# Patient Record
Sex: MALE | Race: WHITE | NOT HISPANIC OR LATINO
[De-identification: names, ages, dates, MRNs, and addresses within clinical notes are randomized per-mention and may not be internally consistent; named-entity substitution may affect disease eponyms.]

---

## 2021-07-31 ENCOUNTER — NON-APPOINTMENT (OUTPATIENT)
Age: 61
End: 2021-07-31

## 2021-08-05 ENCOUNTER — NON-APPOINTMENT (OUTPATIENT)
Age: 61
End: 2021-08-05

## 2021-08-05 ENCOUNTER — APPOINTMENT (OUTPATIENT)
Dept: INTERNAL MEDICINE | Facility: CLINIC | Age: 61
End: 2021-08-05
Payer: COMMERCIAL

## 2021-08-05 ENCOUNTER — TRANSCRIPTION ENCOUNTER (OUTPATIENT)
Age: 61
End: 2021-08-05

## 2021-08-05 ENCOUNTER — LABORATORY RESULT (OUTPATIENT)
Age: 61
End: 2021-08-05

## 2021-08-05 VITALS
WEIGHT: 265 LBS | HEIGHT: 66 IN | BODY MASS INDEX: 42.59 KG/M2 | HEART RATE: 82 BPM | OXYGEN SATURATION: 96 % | TEMPERATURE: 98 F

## 2021-08-05 VITALS — SYSTOLIC BLOOD PRESSURE: 187 MMHG | DIASTOLIC BLOOD PRESSURE: 102 MMHG

## 2021-08-05 DIAGNOSIS — Z83.438 FAMILY HISTORY OF OTHER DISORDER OF LIPOPROTEIN METABOLISM AND OTHER LIPIDEMIA: ICD-10-CM

## 2021-08-05 DIAGNOSIS — J30.89 OTHER ALLERGIC RHINITIS: ICD-10-CM

## 2021-08-05 DIAGNOSIS — Z80.0 FAMILY HISTORY OF MALIGNANT NEOPLASM OF DIGESTIVE ORGANS: ICD-10-CM

## 2021-08-05 DIAGNOSIS — F51.04 PSYCHOPHYSIOLOGIC INSOMNIA: ICD-10-CM

## 2021-08-05 DIAGNOSIS — Z80.7 FAMILY HISTORY OF OTHER MALIGNANT NEOPLASMS OF LYMPHOID, HEMATOPOIETIC AND RELATED TISSUES: ICD-10-CM

## 2021-08-05 DIAGNOSIS — S83.519A SPRAIN OF ANTERIOR CRUCIATE LIGAMENT OF UNSPECIFIED KNEE, INITIAL ENCOUNTER: ICD-10-CM

## 2021-08-05 PROCEDURE — 93000 ELECTROCARDIOGRAM COMPLETE: CPT

## 2021-08-05 PROCEDURE — 99204 OFFICE O/P NEW MOD 45 MIN: CPT | Mod: 25

## 2021-08-05 PROCEDURE — 36415 COLL VENOUS BLD VENIPUNCTURE: CPT

## 2021-08-11 LAB
ALBUMIN SERPL ELPH-MCNC: 4.6 G/DL
ALP BLD-CCNC: 96 U/L
ALT SERPL-CCNC: 32 U/L
ANION GAP SERPL CALC-SCNC: 12 MMOL/L
APPEARANCE: CLEAR
AST SERPL-CCNC: 25 U/L
BACTERIA: NEGATIVE
BASOPHILS # BLD AUTO: 0.07 K/UL
BASOPHILS NFR BLD AUTO: 0.9 %
BILIRUB SERPL-MCNC: 1.1 MG/DL
BILIRUBIN URINE: NEGATIVE
BLOOD URINE: NEGATIVE
BUN SERPL-MCNC: 11 MG/DL
CALCIUM SERPL-MCNC: 9.6 MG/DL
CHLORIDE SERPL-SCNC: 103 MMOL/L
CHOLEST SERPL-MCNC: 133 MG/DL
CO2 SERPL-SCNC: 24 MMOL/L
COLOR: NORMAL
CREAT SERPL-MCNC: 0.7 MG/DL
CREAT SPEC-SCNC: 71 MG/DL
EOSINOPHIL # BLD AUTO: 0 K/UL
EOSINOPHIL NFR BLD AUTO: 0 %
ESTIMATED AVERAGE GLUCOSE: 108 MG/DL
GLUCOSE QUALITATIVE U: NEGATIVE
GLUCOSE SERPL-MCNC: 120 MG/DL
HBA1C MFR BLD HPLC: 5.4 %
HCT VFR BLD CALC: 41.3 %
HDLC SERPL-MCNC: 39 MG/DL
HGB BLD-MCNC: 12.1 G/DL
HYALINE CASTS: 0 /LPF
KETONES URINE: NEGATIVE
LDLC SERPL CALC-MCNC: 76 MG/DL
LEUKOCYTE ESTERASE URINE: NEGATIVE
LYMPHOCYTES # BLD AUTO: 2.01 K/UL
LYMPHOCYTES NFR BLD AUTO: 26.3 %
MAN DIFF?: NORMAL
MCHC RBC-ENTMCNC: 19.1 PG
MCHC RBC-ENTMCNC: 29.3 GM/DL
MCV RBC AUTO: 65.2 FL
MICROALBUMIN 24H UR DL<=1MG/L-MCNC: 1.8 MG/DL
MICROALBUMIN/CREAT 24H UR-RTO: 25 MG/G
MICROSCOPIC-UA: NORMAL
MONOCYTES # BLD AUTO: 0.67 K/UL
MONOCYTES NFR BLD AUTO: 8.8 %
NEUTROPHILS # BLD AUTO: 4.9 K/UL
NEUTROPHILS NFR BLD AUTO: 64 %
NITRITE URINE: NEGATIVE
NONHDLC SERPL-MCNC: 94 MG/DL
PH URINE: 7.5
PLATELET # BLD AUTO: 176 K/UL
POTASSIUM SERPL-SCNC: 3.9 MMOL/L
PROT SERPL-MCNC: 6.9 G/DL
PROTEIN URINE: NEGATIVE
RBC # BLD: 6.33 M/UL
RBC # FLD: 19.4 %
RED BLOOD CELLS URINE: 1 /HPF
SODIUM SERPL-SCNC: 139 MMOL/L
SPECIFIC GRAVITY URINE: 1.01
SQUAMOUS EPITHELIAL CELLS: 0 /HPF
TRIGL SERPL-MCNC: 88 MG/DL
UROBILINOGEN URINE: NORMAL
WBC # FLD AUTO: 7.66 K/UL
WHITE BLOOD CELLS URINE: 0 /HPF

## 2021-08-11 NOTE — HISTORY OF PRESENT ILLNESS
[FreeTextEntry8] : 60 y/o male patient is new to the office presents today to establish care:\par - f/u hypertension - patient states was recently seen by ophthalmologist found to have retinopathy changes, was instructed to see PCP for further evaluation and control, denies any CP/SOB/palpitations/edema, BP very elevated today in office

## 2021-08-11 NOTE — HISTORY OF PRESENT ILLNESS
[FreeTextEntry8] : 62 y/o male patient is new to the office presents today to establish care:\par - f/u hypertension - patient states was recently seen by ophthalmologist found to have retinopathy changes, was instructed to see PCP for further evaluation and control, denies any CP/SOB/palpitations/edema, BP very elevated today in office

## 2021-08-18 ENCOUNTER — APPOINTMENT (OUTPATIENT)
Dept: INTERNAL MEDICINE | Facility: CLINIC | Age: 61
End: 2021-08-18
Payer: COMMERCIAL

## 2021-08-18 VITALS
BODY MASS INDEX: 41.46 KG/M2 | OXYGEN SATURATION: 98 % | TEMPERATURE: 98.1 F | DIASTOLIC BLOOD PRESSURE: 84 MMHG | HEART RATE: 71 BPM | SYSTOLIC BLOOD PRESSURE: 165 MMHG | WEIGHT: 258 LBS | HEIGHT: 66 IN

## 2021-08-18 PROCEDURE — 99214 OFFICE O/P EST MOD 30 MIN: CPT | Mod: 25

## 2021-08-18 PROCEDURE — G0447 BEHAVIOR COUNSEL OBESITY 15M: CPT | Mod: 59

## 2021-08-24 NOTE — COUNSELING
[Potential consequences of obesity discussed] : Potential consequences of obesity discussed [Benefits of weight loss discussed] : Benefits of weight loss discussed [Structured Weight Management Program suggested:] : Structured weight management program suggested [Encouraged to maintain food diary] : Encouraged to maintain food diary [Encouraged to increase physical activity] : Encouraged to increase physical activity [Encouraged to use exercise tracking device] : Encouraged to use exercise tracking device [Weigh Self Weekly] : weigh self weekly [Decrease Portions] : decrease portions [____ min/wk Activity] : [unfilled] min/wk activity [Good understanding] : Patient has a good understanding of disease, goals and obesity follow-up plan [FreeTextEntry1] : W [FreeTextEntry4] : 15

## 2021-08-24 NOTE — HISTORY OF PRESENT ILLNESS
[de-identified] : 60 y/o male patient presents today:\par - review lab results \par - f/u HTN - patient complaint with medication, denies any side effects, BP much improved today in office, BP log at home: 120-140/75-85 at home, denies any CP/SOB/palpitations/edema

## 2021-08-24 NOTE — PHYSICAL EXAM
[No Carotid Bruits] : no carotid bruits [No Abdominal Bruit] : a ~M bruit was not heard ~T in the abdomen [No Edema] : there was no peripheral edema [Normal] : no rash

## 2021-09-07 ENCOUNTER — RX RENEWAL (OUTPATIENT)
Age: 61
End: 2021-09-07

## 2021-10-20 ENCOUNTER — APPOINTMENT (OUTPATIENT)
Dept: INTERNAL MEDICINE | Facility: CLINIC | Age: 61
End: 2021-10-20
Payer: COMMERCIAL

## 2021-10-20 VITALS
BODY MASS INDEX: 40.34 KG/M2 | WEIGHT: 251 LBS | HEIGHT: 66 IN | SYSTOLIC BLOOD PRESSURE: 181 MMHG | TEMPERATURE: 98 F | HEART RATE: 82 BPM | RESPIRATION RATE: 16 BRPM | DIASTOLIC BLOOD PRESSURE: 99 MMHG | OXYGEN SATURATION: 98 %

## 2021-10-20 DIAGNOSIS — Z71.2 PERSON CONSULTING FOR EXPLANATION OF EXAMINATION OR TEST FINDINGS: ICD-10-CM

## 2021-10-20 PROCEDURE — 99214 OFFICE O/P EST MOD 30 MIN: CPT

## 2021-10-28 NOTE — HISTORY OF PRESENT ILLNESS
[de-identified] : 60 y/o male patient presents today:\par - f/u HTN - patient complaint with medication, denies any side effects, BP much improved today in office, BP log at home: 120-140/75-85 at home, denies any CP/SOB/palpitations/edema, has been losing weight following with weight watchers, been walking more, 7 pounds since last visit

## 2022-02-16 ENCOUNTER — LABORATORY RESULT (OUTPATIENT)
Age: 62
End: 2022-02-16

## 2022-02-16 ENCOUNTER — APPOINTMENT (OUTPATIENT)
Dept: INTERNAL MEDICINE | Facility: CLINIC | Age: 62
End: 2022-02-16
Payer: COMMERCIAL

## 2022-02-16 VITALS
TEMPERATURE: 98 F | HEART RATE: 89 BPM | BODY MASS INDEX: 40.98 KG/M2 | OXYGEN SATURATION: 98 % | SYSTOLIC BLOOD PRESSURE: 174 MMHG | HEIGHT: 66 IN | DIASTOLIC BLOOD PRESSURE: 93 MMHG | WEIGHT: 255 LBS

## 2022-02-16 VITALS — DIASTOLIC BLOOD PRESSURE: 84 MMHG | SYSTOLIC BLOOD PRESSURE: 146 MMHG

## 2022-02-16 PROCEDURE — 99396 PREV VISIT EST AGE 40-64: CPT | Mod: 25

## 2022-02-16 PROCEDURE — 36415 COLL VENOUS BLD VENIPUNCTURE: CPT

## 2022-02-28 NOTE — HEALTH RISK ASSESSMENT
[Never] : Never [No] : In the past 12 months have you used drugs other than those required for medical reasons? No [0] : 2) Feeling down, depressed, or hopeless: Not at all (0) [PHQ-2 Negative - No further assessment needed] : PHQ-2 Negative - No further assessment needed [[id number]] : 0 [Change in mental status noted] : No change in mental status noted [None] : None [With Family] : lives with family [Employed] : employed [Single] : single [Sexually Active] : sexually active [High Risk Behavior] : no high risk behavior [Feels Safe at Home] : Feels safe at home [Fully functional (bathing, dressing, toileting, transferring, walking, feeding)] : Fully functional (bathing, dressing, toileting, transferring, walking, feeding) [Fully functional (using the telephone, shopping, preparing meals, housekeeping, doing laundry, using] : Fully functional and needs no help or supervision to perform IADLs (using the telephone, shopping, preparing meals, housekeeping, doing laundry, using transportation, managing medications and managing finances) [Reports changes in hearing] : Reports no changes in hearing [Reports changes in vision] : Reports no changes in vision [Reports normal functional visual acuity (ie: able to read med bottle)] : Reports normal functional visual acuity [Reports changes in dental health] : Reports no changes in dental health negative - no chest pain

## 2022-02-28 NOTE — HISTORY OF PRESENT ILLNESS
[de-identified] : 63 y/o male patient presents today for annual CPE/fasting labs :\par - HCM: colonoscopy - never

## 2022-02-28 NOTE — PHYSICAL EXAM
[No Acute Distress] : no acute distress [Well Nourished] : well nourished [Well Developed] : well developed [Well-Appearing] : well-appearing [Normal Sclera/Conjunctiva] : normal sclera/conjunctiva [PERRL] : pupils equal round and reactive to light [EOMI] : extraocular movements intact [Normal Outer Ear/Nose] : the outer ears and nose were normal in appearance [Normal Oropharynx] : the oropharynx was normal [Normal TMs] : both tympanic membranes were normal [Normal Nasal Mucosa] : the nasal mucosa was normal [No Lymphadenopathy] : no lymphadenopathy [No JVD] : no jugular venous distention [Supple] : supple [Thyroid Normal, No Nodules] : the thyroid was normal and there were no nodules present [No Respiratory Distress] : no respiratory distress  [No Accessory Muscle Use] : no accessory muscle use [Clear to Auscultation] : lungs were clear to auscultation bilaterally [Normal Rate] : normal rate  [Regular Rhythm] : with a regular rhythm [Normal S1, S2] : normal S1 and S2 [No Murmur] : no murmur heard [No Carotid Bruits] : no carotid bruits [No Abdominal Bruit] : a ~M bruit was not heard ~T in the abdomen [No Varicosities] : no varicosities [Pedal Pulses Present] : the pedal pulses are present [No Edema] : there was no peripheral edema [No Palpable Aorta] : no palpable aorta [No Extremity Clubbing/Cyanosis] : no extremity clubbing/cyanosis [Soft] : abdomen soft [Non Tender] : non-tender [Non-distended] : non-distended [No Masses] : no abdominal mass palpated [No HSM] : no HSM [Normal Bowel Sounds] : normal bowel sounds [Normal Posterior Cervical Nodes] : no posterior cervical lymphadenopathy [Normal Anterior Cervical Nodes] : no anterior cervical lymphadenopathy [No Spinal Tenderness] : no spinal tenderness [No CVA Tenderness] : no CVA  tenderness [No Joint Swelling] : no joint swelling [Grossly Normal Strength/Tone] : grossly normal strength/tone [No Rash] : no rash [Coordination Grossly Intact] : coordination grossly intact [No Focal Deficits] : no focal deficits [Normal Gait] : normal gait [Deep Tendon Reflexes (DTR)] : deep tendon reflexes were 2+ and symmetric [Speech Grossly Normal] : speech grossly normal [Memory Grossly Normal] : memory grossly normal [Normal Affect] : the affect was normal [Alert and Oriented x3] : oriented to person, place, and time [Normal Mood] : the mood was normal [Normal Insight/Judgement] : insight and judgment were intact

## 2022-03-20 ENCOUNTER — TRANSCRIPTION ENCOUNTER (OUTPATIENT)
Age: 62
End: 2022-03-20

## 2022-03-20 LAB
25(OH)D3 SERPL-MCNC: 34.5 NG/ML
ALBUMIN SERPL ELPH-MCNC: 4.6 G/DL
ALP BLD-CCNC: 89 U/L
ALT SERPL-CCNC: 24 U/L
ANION GAP SERPL CALC-SCNC: 16 MMOL/L
APPEARANCE: CLEAR
AST SERPL-CCNC: 21 U/L
BACTERIA: NEGATIVE
BASOPHILS # BLD AUTO: 0.05 K/UL
BASOPHILS NFR BLD AUTO: 0.6 %
BILIRUB SERPL-MCNC: 1.2 MG/DL
BILIRUBIN URINE: NEGATIVE
BLOOD URINE: NEGATIVE
BUN SERPL-MCNC: 11 MG/DL
CALCIUM SERPL-MCNC: 9.3 MG/DL
CHLORIDE SERPL-SCNC: 102 MMOL/L
CHOLEST SERPL-MCNC: 149 MG/DL
CO2 SERPL-SCNC: 22 MMOL/L
COLOR: NORMAL
CREAT SERPL-MCNC: 0.75 MG/DL
EOSINOPHIL # BLD AUTO: 0.1 K/UL
EOSINOPHIL NFR BLD AUTO: 1.2 %
ESTIMATED AVERAGE GLUCOSE: 105 MG/DL
GLUCOSE QUALITATIVE U: NEGATIVE
GLUCOSE SERPL-MCNC: 106 MG/DL
HBA1C MFR BLD HPLC: 5.3 %
HCT VFR BLD CALC: 41.1 %
HDLC SERPL-MCNC: 44 MG/DL
HGB BLD-MCNC: 12.5 G/DL
HYALINE CASTS: 0 /LPF
IMM GRANULOCYTES NFR BLD AUTO: 0.8 %
KETONES URINE: NEGATIVE
LDLC SERPL CALC-MCNC: 84 MG/DL
LEUKOCYTE ESTERASE URINE: NEGATIVE
LYMPHOCYTES # BLD AUTO: 2.21 K/UL
LYMPHOCYTES NFR BLD AUTO: 25.5 %
MAN DIFF?: NORMAL
MCHC RBC-ENTMCNC: 19.2 PG
MCHC RBC-ENTMCNC: 30.4 GM/DL
MCV RBC AUTO: 63 FL
MICROSCOPIC-UA: NORMAL
MONOCYTES # BLD AUTO: 0.83 K/UL
MONOCYTES NFR BLD AUTO: 9.6 %
NEUTROPHILS # BLD AUTO: 5.42 K/UL
NEUTROPHILS NFR BLD AUTO: 62.3 %
NITRITE URINE: NEGATIVE
NONHDLC SERPL-MCNC: 105 MG/DL
PH URINE: 7
PLATELET # BLD AUTO: 170 K/UL
POTASSIUM SERPL-SCNC: 4 MMOL/L
PROT SERPL-MCNC: 6.9 G/DL
PROTEIN URINE: NEGATIVE
PSA FREE FLD-MCNC: 22 %
PSA FREE SERPL-MCNC: 0.18 NG/ML
PSA SERPL-MCNC: 0.85 NG/ML
RBC # BLD: 6.52 M/UL
RBC # FLD: 19.5 %
RED BLOOD CELLS URINE: 2 /HPF
SODIUM SERPL-SCNC: 140 MMOL/L
SPECIFIC GRAVITY URINE: 1.01
SQUAMOUS EPITHELIAL CELLS: 0 /HPF
TRIGL SERPL-MCNC: 106 MG/DL
TSH SERPL-ACNC: 1.47 UIU/ML
UROBILINOGEN URINE: NORMAL
WBC # FLD AUTO: 8.68 K/UL
WHITE BLOOD CELLS URINE: 0 /HPF

## 2022-03-23 ENCOUNTER — TRANSCRIPTION ENCOUNTER (OUTPATIENT)
Age: 62
End: 2022-03-23

## 2022-05-27 ENCOUNTER — RX RENEWAL (OUTPATIENT)
Age: 62
End: 2022-05-27

## 2022-08-17 ENCOUNTER — APPOINTMENT (OUTPATIENT)
Dept: INTERNAL MEDICINE | Facility: CLINIC | Age: 62
End: 2022-08-17

## 2022-11-28 ENCOUNTER — RX RENEWAL (OUTPATIENT)
Age: 62
End: 2022-11-28

## 2023-05-19 ENCOUNTER — LABORATORY RESULT (OUTPATIENT)
Age: 63
End: 2023-05-19

## 2023-05-19 ENCOUNTER — NON-APPOINTMENT (OUTPATIENT)
Age: 63
End: 2023-05-19

## 2023-05-19 ENCOUNTER — APPOINTMENT (OUTPATIENT)
Dept: INTERNAL MEDICINE | Facility: CLINIC | Age: 63
End: 2023-05-19
Payer: COMMERCIAL

## 2023-05-19 VITALS
SYSTOLIC BLOOD PRESSURE: 160 MMHG | WEIGHT: 255 LBS | HEART RATE: 86 BPM | HEIGHT: 66 IN | BODY MASS INDEX: 40.98 KG/M2 | TEMPERATURE: 98.1 F | OXYGEN SATURATION: 99 % | DIASTOLIC BLOOD PRESSURE: 89 MMHG

## 2023-05-19 DIAGNOSIS — Z00.00 ENCOUNTER FOR GENERAL ADULT MEDICAL EXAMINATION W/OUT ABNORMAL FINDINGS: ICD-10-CM

## 2023-05-19 PROCEDURE — 99396 PREV VISIT EST AGE 40-64: CPT | Mod: 25

## 2023-05-19 PROCEDURE — 93000 ELECTROCARDIOGRAM COMPLETE: CPT

## 2023-05-19 PROCEDURE — 36415 COLL VENOUS BLD VENIPUNCTURE: CPT

## 2023-05-30 NOTE — HEALTH RISK ASSESSMENT
[No] : In the past 12 months have you used drugs other than those required for medical reasons? No [No falls in past year] : Patient reported no falls in the past year [0] : 2) Feeling down, depressed, or hopeless: Not at all (0) [PHQ-2 Negative - No further assessment needed] : PHQ-2 Negative - No further assessment needed [[id number]] : 0 [Change in mental status noted] : No change in mental status noted [None] : None [Alone] : lives alone [Employed] : employed [Single] : single [Sexually Active] : sexually active [High Risk Behavior] : no high risk behavior [Feels Safe at Home] : Feels safe at home [Reports changes in hearing] : Reports no changes in hearing [Reports changes in vision] : Reports no changes in vision [Reports changes in dental health] : Reports no changes in dental health [Never] : Never

## 2023-05-30 NOTE — HISTORY OF PRESENT ILLNESS
[de-identified] : 62 y/o male patient presents today for annual CPE/fasting labs\par - reviewed age appropriate preventive screening exams\par - reviewed and updated PMH/Medications/Allergies/Surgical hx\par - f/u HTN - BP elevated in office today, denies any CP/SOB/Palpitations/edema, compliant with medication, check labs/EKG, monitoring BP at home \par

## 2023-09-13 LAB
25(OH)D3 SERPL-MCNC: 34.1 NG/ML
ALBUMIN SERPL ELPH-MCNC: 4.7 G/DL
ALP BLD-CCNC: 84 U/L
ALT SERPL-CCNC: 26 U/L
ANION GAP SERPL CALC-SCNC: 14 MMOL/L
APPEARANCE: CLEAR
AST SERPL-CCNC: 28 U/L
BACTERIA: NEGATIVE /HPF
BASOPHILS # BLD AUTO: 0.04 K/UL
BASOPHILS NFR BLD AUTO: 0.5 %
BILIRUB SERPL-MCNC: 2 MG/DL
BILIRUBIN URINE: NEGATIVE
BLOOD URINE: NEGATIVE
BUN SERPL-MCNC: 12 MG/DL
CALCIUM OXALATE CRYSTALS: PRESENT
CALCIUM SERPL-MCNC: 9.6 MG/DL
CAST: 0 /LPF
CHLORIDE SERPL-SCNC: 102 MMOL/L
CHOLEST SERPL-MCNC: 133 MG/DL
CO2 SERPL-SCNC: 24 MMOL/L
COLOR: YELLOW
CREAT SERPL-MCNC: 0.79 MG/DL
CREAT SPEC-SCNC: 133 MG/DL
EGFR: 100 ML/MIN/1.73M2
EOSINOPHIL # BLD AUTO: 0.07 K/UL
EOSINOPHIL NFR BLD AUTO: 0.9 %
EPITHELIAL CELLS: 0 /HPF
ESTIMATED AVERAGE GLUCOSE: 108 MG/DL
GLUCOSE QUALITATIVE U: NEGATIVE MG/DL
GLUCOSE SERPL-MCNC: 105 MG/DL
HBA1C MFR BLD HPLC: 5.4 %
HCT VFR BLD CALC: 40.7 %
HDLC SERPL-MCNC: 43 MG/DL
HGB BLD-MCNC: 12.4 G/DL
IMM GRANULOCYTES NFR BLD AUTO: 0.5 %
KETONES URINE: ABNORMAL MG/DL
LDLC SERPL CALC-MCNC: 80 MG/DL
LEUKOCYTE ESTERASE URINE: NEGATIVE
LYMPHOCYTES # BLD AUTO: 1.98 K/UL
LYMPHOCYTES NFR BLD AUTO: 25 %
MAN DIFF?: NORMAL
MCHC RBC-ENTMCNC: 19.3 PG
MCHC RBC-ENTMCNC: 30.5 GM/DL
MCV RBC AUTO: 63.5 FL
MICROALBUMIN 24H UR DL<=1MG/L-MCNC: <1.2 MG/DL
MICROALBUMIN/CREAT 24H UR-RTO: NORMAL MG/G
MICROSCOPIC-UA: NORMAL
MONOCYTES # BLD AUTO: 0.7 K/UL
MONOCYTES NFR BLD AUTO: 8.8 %
NEUTROPHILS # BLD AUTO: 5.09 K/UL
NEUTROPHILS NFR BLD AUTO: 64.3 %
NITRITE URINE: NEGATIVE
NONHDLC SERPL-MCNC: 90 MG/DL
PH URINE: 7.5
PLATELET # BLD AUTO: 165 K/UL
POTASSIUM SERPL-SCNC: 4.2 MMOL/L
PROT SERPL-MCNC: 6.7 G/DL
PROTEIN URINE: NEGATIVE MG/DL
PSA FREE FLD-MCNC: 22 %
PSA FREE SERPL-MCNC: 0.21 NG/ML
PSA SERPL-MCNC: 0.93 NG/ML
RBC # BLD: 6.41 M/UL
RBC # FLD: 19.8 %
RED BLOOD CELLS URINE: 4 /HPF
REVIEW: NORMAL
SODIUM SERPL-SCNC: 140 MMOL/L
SPECIFIC GRAVITY URINE: 1.02
TRIGL SERPL-MCNC: 54 MG/DL
TSH SERPL-ACNC: 1.18 UIU/ML
UROBILINOGEN URINE: 0.2 MG/DL
WBC # FLD AUTO: 7.92 K/UL
WHITE BLOOD CELLS URINE: 0 /HPF

## 2023-12-15 ENCOUNTER — APPOINTMENT (OUTPATIENT)
Dept: INTERNAL MEDICINE | Facility: CLINIC | Age: 63
End: 2023-12-15
Payer: COMMERCIAL

## 2023-12-15 VITALS
BODY MASS INDEX: 42.43 KG/M2 | WEIGHT: 264 LBS | SYSTOLIC BLOOD PRESSURE: 180 MMHG | HEART RATE: 99 BPM | OXYGEN SATURATION: 99 % | DIASTOLIC BLOOD PRESSURE: 72 MMHG | HEIGHT: 66 IN | TEMPERATURE: 98 F

## 2023-12-15 DIAGNOSIS — E66.01 MORBID (SEVERE) OBESITY DUE TO EXCESS CALORIES: ICD-10-CM

## 2023-12-15 PROCEDURE — 36415 COLL VENOUS BLD VENIPUNCTURE: CPT

## 2023-12-15 PROCEDURE — 99214 OFFICE O/P EST MOD 30 MIN: CPT | Mod: 25

## 2023-12-15 NOTE — COUNSELING
[Potential consequences of obesity discussed] : Potential consequences of obesity discussed [Benefits of weight loss discussed] : Benefits of weight loss discussed [Structured Weight Management Program suggested:] : Structured weight management program suggested [Encouraged to maintain food diary] : Encouraged to maintain food diary [Encouraged to increase physical activity] : Encouraged to increase physical activity [Encouraged to use exercise tracking device] : Encouraged to use exercise tracking device [Weigh Self Weekly] : weigh self weekly [Decrease Portions] : decrease portions [Good understanding] : Patient has a good understanding of disease, goals and obesity follow-up plan

## 2023-12-15 NOTE — HISTORY OF PRESENT ILLNESS
[de-identified] : 64 y/o male patient presents today: - f/u HTN/obesity - BP elevated in office today, reviewed home BP readings has increased over past few months 140-150's/80-90's, denies any CP/SOB/Palpitations/edema, admits has gained weight, increased stress caring for elderly mother, has been compliant with  medications

## 2024-05-15 LAB
ALBUMIN SERPL ELPH-MCNC: 4.6 G/DL
ALP BLD-CCNC: 92 U/L
ALT SERPL-CCNC: 32 U/L
ANION GAP SERPL CALC-SCNC: 10 MMOL/L
APPEARANCE: CLEAR
AST SERPL-CCNC: 31 U/L
BACTERIA: NEGATIVE /HPF
BILIRUB SERPL-MCNC: 1.1 MG/DL
BILIRUBIN URINE: NEGATIVE
BLOOD URINE: NEGATIVE
BUN SERPL-MCNC: 10 MG/DL
CALCIUM SERPL-MCNC: 9.7 MG/DL
CAST: 0 /LPF
CHLORIDE SERPL-SCNC: 101 MMOL/L
CHOLEST SERPL-MCNC: 146 MG/DL
CO2 SERPL-SCNC: 25 MMOL/L
COLOR: YELLOW
CREAT SERPL-MCNC: 0.73 MG/DL
EGFR: 103 ML/MIN/1.73M2
EPITHELIAL CELLS: 0 /HPF
ESTIMATED AVERAGE GLUCOSE: 111 MG/DL
GLUCOSE QUALITATIVE U: NEGATIVE MG/DL
GLUCOSE SERPL-MCNC: 106 MG/DL
HBA1C MFR BLD HPLC: 5.5 %
HDLC SERPL-MCNC: 45 MG/DL
KETONES URINE: NEGATIVE MG/DL
LDLC SERPL CALC-MCNC: 80 MG/DL
LEUKOCYTE ESTERASE URINE: NEGATIVE
MICROSCOPIC-UA: NORMAL
NITRITE URINE: NEGATIVE
NONHDLC SERPL-MCNC: 101 MG/DL
PH URINE: 7.5
POTASSIUM SERPL-SCNC: 3.9 MMOL/L
PROT SERPL-MCNC: 7 G/DL
PROTEIN URINE: NEGATIVE MG/DL
RED BLOOD CELLS URINE: 1 /HPF
SODIUM SERPL-SCNC: 136 MMOL/L
SPECIFIC GRAVITY URINE: 1.01
TRIGL SERPL-MCNC: 119 MG/DL
TSH SERPL-ACNC: 1.68 UIU/ML
UROBILINOGEN URINE: 0.2 MG/DL
WHITE BLOOD CELLS URINE: 0 /HPF

## 2024-06-14 ENCOUNTER — APPOINTMENT (OUTPATIENT)
Dept: INTERNAL MEDICINE | Facility: CLINIC | Age: 64
End: 2024-06-14
Payer: COMMERCIAL

## 2024-06-14 VITALS
DIASTOLIC BLOOD PRESSURE: 81 MMHG | TEMPERATURE: 97.9 F | SYSTOLIC BLOOD PRESSURE: 183 MMHG | HEIGHT: 66 IN | BODY MASS INDEX: 40.5 KG/M2 | OXYGEN SATURATION: 99 % | HEART RATE: 95 BPM | WEIGHT: 252 LBS

## 2024-06-14 DIAGNOSIS — K58.9 IRRITABLE BOWEL SYNDROME W/OUT DIARRHEA: ICD-10-CM

## 2024-06-14 DIAGNOSIS — I10 ESSENTIAL (PRIMARY) HYPERTENSION: ICD-10-CM

## 2024-06-14 DIAGNOSIS — E66.9 OBESITY, UNSPECIFIED: ICD-10-CM

## 2024-06-14 DIAGNOSIS — E66.01 MORBID (SEVERE) OBESITY DUE TO EXCESS CALORIES: ICD-10-CM

## 2024-06-14 DIAGNOSIS — J32.9 CHRONIC SINUSITIS, UNSPECIFIED: ICD-10-CM

## 2024-06-14 PROCEDURE — 99214 OFFICE O/P EST MOD 30 MIN: CPT

## 2024-06-14 RX ORDER — MOMETASONE 50 UG/1
50 SPRAY, METERED NASAL DAILY
Qty: 1 | Refills: 3 | Status: DISCONTINUED | COMMUNITY
Start: 2021-10-20 | End: 2024-06-14

## 2024-06-14 RX ORDER — LOSARTAN POTASSIUM AND HYDROCHLOROTHIAZIDE 25; 100 MG/1; MG/1
100-25 TABLET ORAL DAILY
Qty: 90 | Refills: 1 | Status: ACTIVE | COMMUNITY
Start: 2021-08-05 | End: 1900-01-01

## 2024-06-14 RX ORDER — FLUTICASONE PROPIONATE 50 UG/1
50 SPRAY, METERED NASAL
Qty: 1 | Refills: 3 | Status: ACTIVE | COMMUNITY
Start: 2024-06-14 | End: 1900-01-01

## 2024-06-18 PROBLEM — I10 BENIGN ESSENTIAL HYPERTENSION: Status: ACTIVE | Noted: 2021-08-05

## 2024-06-18 PROBLEM — E66.9 OBESITY, CLASS II, BMI 35-39.9: Status: ACTIVE | Noted: 2024-06-14

## 2024-06-18 NOTE — HISTORY OF PRESENT ILLNESS
[de-identified] : 65 y/o male patient presents today: - f/u HTN/obesity - BP elevated in office today, reviewed home BP readings well controlled, denies any CP/SOB/Palpitations/edema, has been working with nutritionist/dietician has been able to lose weight, feeling better

## 2025-01-17 ENCOUNTER — APPOINTMENT (OUTPATIENT)
Dept: INTERNAL MEDICINE | Facility: CLINIC | Age: 65
End: 2025-01-17
Payer: COMMERCIAL

## 2025-01-17 ENCOUNTER — NON-APPOINTMENT (OUTPATIENT)
Age: 65
End: 2025-01-17

## 2025-01-17 ENCOUNTER — LABORATORY RESULT (OUTPATIENT)
Age: 65
End: 2025-01-17

## 2025-01-17 VITALS
RESPIRATION RATE: 16 BRPM | DIASTOLIC BLOOD PRESSURE: 79 MMHG | HEART RATE: 92 BPM | WEIGHT: 248 LBS | BODY MASS INDEX: 39.86 KG/M2 | OXYGEN SATURATION: 99 % | TEMPERATURE: 98 F | SYSTOLIC BLOOD PRESSURE: 153 MMHG | HEIGHT: 66 IN

## 2025-01-17 DIAGNOSIS — F51.04 PSYCHOPHYSIOLOGIC INSOMNIA: ICD-10-CM

## 2025-01-17 DIAGNOSIS — E66.812 OBESITY, CLASS 2: ICD-10-CM

## 2025-01-17 DIAGNOSIS — Z00.00 ENCOUNTER FOR GENERAL ADULT MEDICAL EXAMINATION W/OUT ABNORMAL FINDINGS: ICD-10-CM

## 2025-01-17 DIAGNOSIS — I10 ESSENTIAL (PRIMARY) HYPERTENSION: ICD-10-CM

## 2025-01-17 PROCEDURE — 99396 PREV VISIT EST AGE 40-64: CPT

## 2025-01-17 PROCEDURE — 93000 ELECTROCARDIOGRAM COMPLETE: CPT

## 2025-01-17 PROCEDURE — 36415 COLL VENOUS BLD VENIPUNCTURE: CPT

## 2025-01-18 LAB
25(OH)D3 SERPL-MCNC: 36.1 NG/ML
ALBUMIN SERPL ELPH-MCNC: 4.6 G/DL
ALP BLD-CCNC: 103 U/L
ALT SERPL-CCNC: 28 U/L
ANION GAP SERPL CALC-SCNC: 13 MMOL/L
APPEARANCE: CLEAR
AST SERPL-CCNC: 24 U/L
BACTERIA: NEGATIVE /HPF
BASOPHILS # BLD AUTO: 0.13 K/UL
BASOPHILS NFR BLD AUTO: 1.8 %
BILIRUB SERPL-MCNC: 1.3 MG/DL
BILIRUBIN URINE: NEGATIVE
BLOOD URINE: NEGATIVE
BUN SERPL-MCNC: 12 MG/DL
CALCIUM SERPL-MCNC: 9.7 MG/DL
CAST: NORMAL /LPF
CHLORIDE SERPL-SCNC: 101 MMOL/L
CHOLEST SERPL-MCNC: 125 MG/DL
CO2 SERPL-SCNC: 25 MMOL/L
COLOR: YELLOW
CREAT SERPL-MCNC: 0.8 MG/DL
EGFR: 99 ML/MIN/1.73M2
EOSINOPHIL # BLD AUTO: 0.06 K/UL
EOSINOPHIL NFR BLD AUTO: 0.9 %
EPITHELIAL CELLS: 0 /HPF
ESTIMATED AVERAGE GLUCOSE: 105 MG/DL
GLUCOSE QUALITATIVE U: NEGATIVE MG/DL
GLUCOSE SERPL-MCNC: 126 MG/DL
HBA1C MFR BLD HPLC: 5.3 %
HCT VFR BLD CALC: 41.1 %
HDLC SERPL-MCNC: 40 MG/DL
HGB BLD-MCNC: 12.8 G/DL
KETONES URINE: NEGATIVE MG/DL
LDLC SERPL CALC-MCNC: 69 MG/DL
LEUKOCYTE ESTERASE URINE: ABNORMAL
LYMPHOCYTES # BLD AUTO: 2.27 K/UL
LYMPHOCYTES NFR BLD AUTO: 31.8 %
MAN DIFF?: NORMAL
MCHC RBC-ENTMCNC: 19.4 PG
MCHC RBC-ENTMCNC: 31.1 G/DL
MCV RBC AUTO: 62.4 FL
MICROSCOPIC-UA: NORMAL
MONOCYTES # BLD AUTO: 0.44 K/UL
MONOCYTES NFR BLD AUTO: 6.2 %
NEUTROPHILS # BLD AUTO: 4.17 K/UL
NEUTROPHILS NFR BLD AUTO: 58.4 %
NITRITE URINE: NEGATIVE
NONHDLC SERPL-MCNC: 86 MG/DL
PH URINE: 8.5
PLATELET # BLD AUTO: 153 K/UL
POTASSIUM SERPL-SCNC: 3.9 MMOL/L
PROT SERPL-MCNC: 6.9 G/DL
PROTEIN URINE: NORMAL MG/DL
PSA FREE FLD-MCNC: 27 %
PSA FREE SERPL-MCNC: 0.36 NG/ML
PSA SERPL-MCNC: 1.35 NG/ML
RBC # BLD: 6.59 M/UL
RBC # FLD: 18.8 %
RED BLOOD CELLS URINE: NORMAL /HPF
REVIEW: NORMAL
SODIUM SERPL-SCNC: 139 MMOL/L
SPECIFIC GRAVITY URINE: 1.02
TRIGL SERPL-MCNC: 85 MG/DL
UROBILINOGEN URINE: 1 MG/DL
WBC # FLD AUTO: 7.14 K/UL
WHITE BLOOD CELLS URINE: 0 /HPF

## 2025-01-27 ENCOUNTER — TRANSCRIPTION ENCOUNTER (OUTPATIENT)
Age: 65
End: 2025-01-27

## 2025-03-29 ENCOUNTER — NON-APPOINTMENT (OUTPATIENT)
Age: 65
End: 2025-03-29

## 2025-06-11 ENCOUNTER — NON-APPOINTMENT (OUTPATIENT)
Age: 65
End: 2025-06-11

## 2025-06-12 ENCOUNTER — NON-APPOINTMENT (OUTPATIENT)
Age: 65
End: 2025-06-12

## 2025-06-12 ENCOUNTER — APPOINTMENT (OUTPATIENT)
Dept: OPHTHALMOLOGY | Facility: CLINIC | Age: 65
End: 2025-06-12
Payer: COMMERCIAL

## 2025-06-12 PROCEDURE — 92002 INTRM OPH EXAM NEW PATIENT: CPT

## 2025-06-12 PROCEDURE — 92133 CPTRZD OPH DX IMG PST SGM ON: CPT

## 2025-06-12 PROCEDURE — 92020 GONIOSCOPY: CPT

## 2025-08-01 ENCOUNTER — APPOINTMENT (OUTPATIENT)
Dept: INTERNAL MEDICINE | Facility: CLINIC | Age: 65
End: 2025-08-01
Payer: COMMERCIAL

## 2025-08-01 ENCOUNTER — LABORATORY RESULT (OUTPATIENT)
Age: 65
End: 2025-08-01

## 2025-08-01 VITALS
DIASTOLIC BLOOD PRESSURE: 90 MMHG | SYSTOLIC BLOOD PRESSURE: 168 MMHG | RESPIRATION RATE: 15 BRPM | BODY MASS INDEX: 41.78 KG/M2 | OXYGEN SATURATION: 95 % | HEART RATE: 91 BPM | TEMPERATURE: 98 F | WEIGHT: 260 LBS | HEIGHT: 66 IN

## 2025-08-01 VITALS — DIASTOLIC BLOOD PRESSURE: 82 MMHG | SYSTOLIC BLOOD PRESSURE: 136 MMHG

## 2025-08-01 DIAGNOSIS — I10 ESSENTIAL (PRIMARY) HYPERTENSION: ICD-10-CM

## 2025-08-01 DIAGNOSIS — D64.9 ANEMIA, UNSPECIFIED: ICD-10-CM

## 2025-08-01 DIAGNOSIS — Z76.89 PERSONS ENCOUNTERING HEALTH SERVICES IN OTHER SPECIFIED CIRCUMSTANCES: ICD-10-CM

## 2025-08-01 DIAGNOSIS — R73.01 IMPAIRED FASTING GLUCOSE: ICD-10-CM

## 2025-08-01 DIAGNOSIS — H54.7 UNSPECIFIED VISUAL LOSS: ICD-10-CM

## 2025-08-01 DIAGNOSIS — K58.9 IRRITABLE BOWEL SYNDROME, UNSPECIFIED: ICD-10-CM

## 2025-08-01 DIAGNOSIS — L91.8 OTHER HYPERTROPHIC DISORDERS OF THE SKIN: ICD-10-CM

## 2025-08-01 DIAGNOSIS — H10.12 ACUTE ATOPIC CONJUNCTIVITIS, LEFT EYE: ICD-10-CM

## 2025-08-01 DIAGNOSIS — Z12.11 ENCOUNTER FOR SCREENING FOR MALIGNANT NEOPLASM OF COLON: ICD-10-CM

## 2025-08-01 DIAGNOSIS — E66.01 MORBID (SEVERE) OBESITY DUE TO EXCESS CALORIES: ICD-10-CM

## 2025-08-01 DIAGNOSIS — J30.89 OTHER ALLERGIC RHINITIS: ICD-10-CM

## 2025-08-01 DIAGNOSIS — R35.1 NOCTURIA: ICD-10-CM

## 2025-08-01 DIAGNOSIS — E66.812 OBESITY, CLASS 2: ICD-10-CM

## 2025-08-01 PROCEDURE — 36415 COLL VENOUS BLD VENIPUNCTURE: CPT

## 2025-08-01 PROCEDURE — 99214 OFFICE O/P EST MOD 30 MIN: CPT

## 2025-08-01 PROCEDURE — G2211 COMPLEX E/M VISIT ADD ON: CPT

## 2025-08-01 RX ORDER — PSYLLIUM SEED
28.3 PACKET (EA) ORAL
Refills: 0 | Status: ACTIVE | COMMUNITY

## 2025-08-01 RX ORDER — TAMSULOSIN HYDROCHLORIDE 0.4 MG/1
0.4 CAPSULE ORAL
Qty: 30 | Refills: 1 | Status: ACTIVE | COMMUNITY
Start: 2025-08-01 | End: 1900-01-01

## 2025-08-01 RX ORDER — ASCORBIC ACID 500 MG
TABLET ORAL
Refills: 0 | Status: ACTIVE | COMMUNITY

## 2025-08-01 RX ORDER — LORATADINE 10 MG
10 CAPSULE ORAL
Refills: 0 | Status: ACTIVE | COMMUNITY

## 2025-08-01 RX ORDER — OLOPATADINE HYDROCHLORIDE OPHTHALMIC 1 MG/ML
0.1 SOLUTION OPHTHALMIC TWICE DAILY
Qty: 1 | Refills: 3 | Status: ACTIVE | COMMUNITY
Start: 2025-08-01 | End: 1900-01-01

## 2025-08-03 LAB
ALBUMIN SERPL ELPH-MCNC: 4.7 G/DL
ALP BLD-CCNC: 100 U/L
ALT SERPL-CCNC: 30 U/L
ANION GAP SERPL CALC-SCNC: 13 MMOL/L
APPEARANCE: CLEAR
AST SERPL-CCNC: 24 U/L
BASOPHILS # BLD AUTO: 0.07 K/UL
BASOPHILS NFR BLD AUTO: 1 %
BILIRUB SERPL-MCNC: 1.2 MG/DL
BILIRUBIN URINE: NEGATIVE
BLOOD URINE: NEGATIVE
BUN SERPL-MCNC: 10 MG/DL
CALCIUM SERPL-MCNC: 10 MG/DL
CHLORIDE SERPL-SCNC: 100 MMOL/L
CHOLEST SERPL-MCNC: 139 MG/DL
CO2 SERPL-SCNC: 24 MMOL/L
COLOR: YELLOW
CREAT SERPL-MCNC: 0.71 MG/DL
EGFRCR SERPLBLD CKD-EPI 2021: 102 ML/MIN/1.73M2
EOSINOPHIL # BLD AUTO: 0.11 K/UL
EOSINOPHIL NFR BLD AUTO: 1.5 %
ESTIMATED AVERAGE GLUCOSE: 108 MG/DL
GLUCOSE QUALITATIVE U: NEGATIVE MG/DL
GLUCOSE SERPL-MCNC: 152 MG/DL
HBA1C MFR BLD HPLC: 5.4 %
HCT VFR BLD CALC: 42.7 %
HDLC SERPL-MCNC: 41 MG/DL
HGB BLD-MCNC: 12.9 G/DL
KETONES URINE: NEGATIVE MG/DL
LDLC SERPL-MCNC: 76 MG/DL
LEUKOCYTE ESTERASE URINE: NEGATIVE
LYMPHOCYTES # BLD AUTO: 1.89 K/UL
LYMPHOCYTES NFR BLD AUTO: 26.2 %
MAN DIFF?: NORMAL
MCHC RBC-ENTMCNC: 19.5 PG
MCHC RBC-ENTMCNC: 30.2 G/DL
MCV RBC AUTO: 64.6 FL
MONOCYTES # BLD AUTO: 0.4 K/UL
MONOCYTES NFR BLD AUTO: 5.5 %
NEUTROPHILS # BLD AUTO: 4.74 K/UL
NEUTROPHILS NFR BLD AUTO: 65.8 %
NITRITE URINE: NEGATIVE
NONHDLC SERPL-MCNC: 98 MG/DL
PH URINE: 8
PLATELET # BLD AUTO: 162 K/UL
POTASSIUM SERPL-SCNC: 3.8 MMOL/L
PROT SERPL-MCNC: 7.1 G/DL
PROTEIN URINE: NEGATIVE MG/DL
RBC # BLD: 6.61 M/UL
RBC # FLD: 20.1 %
SODIUM SERPL-SCNC: 138 MMOL/L
SPECIFIC GRAVITY URINE: 1.01
TRIGL SERPL-MCNC: 124 MG/DL
UROBILINOGEN URINE: 0.2 MG/DL
WBC # FLD AUTO: 7.21 K/UL

## 2025-08-05 DIAGNOSIS — D56.3 THALASSEMIA MINOR: ICD-10-CM

## 2025-08-05 LAB
HGB A MFR BLD: 93.7 %
HGB A2 MFR BLD: 5.9 %
HGB F MFR BLD: 0.4 %
HGB FRACT BLD-IMP: NORMAL